# Patient Record
Sex: FEMALE | Race: ASIAN | NOT HISPANIC OR LATINO | URBAN - METROPOLITAN AREA
[De-identification: names, ages, dates, MRNs, and addresses within clinical notes are randomized per-mention and may not be internally consistent; named-entity substitution may affect disease eponyms.]

---

## 2020-05-17 ENCOUNTER — INPATIENT (INPATIENT)
Facility: HOSPITAL | Age: 35
LOS: 1 days | Discharge: HOME | End: 2020-05-19
Attending: OBSTETRICS & GYNECOLOGY | Admitting: OBSTETRICS & GYNECOLOGY

## 2020-05-17 VITALS
RESPIRATION RATE: 20 BRPM | DIASTOLIC BLOOD PRESSURE: 85 MMHG | TEMPERATURE: 99 F | HEART RATE: 92 BPM | SYSTOLIC BLOOD PRESSURE: 145 MMHG

## 2020-05-17 DIAGNOSIS — O26.899 OTHER SPECIFIED PREGNANCY RELATED CONDITIONS, UNSPECIFIED TRIMESTER: ICD-10-CM

## 2020-05-17 DIAGNOSIS — O41.90X0 DISORDER OF AMNIOTIC FLUID AND MEMBRANES, UNSPECIFIED, UNSPECIFIED TRIMESTER, NOT APPLICABLE OR UNSPECIFIED: ICD-10-CM

## 2020-05-17 DIAGNOSIS — Z3A.00 WEEKS OF GESTATION OF PREGNANCY NOT SPECIFIED: ICD-10-CM

## 2020-05-17 LAB
ALBUMIN SERPL ELPH-MCNC: 3.6 G/DL — SIGNIFICANT CHANGE UP (ref 3.3–5)
ALP SERPL-CCNC: 168 U/L — HIGH (ref 40–120)
ALT FLD-CCNC: 7 U/L — SIGNIFICANT CHANGE UP (ref 4–33)
ANION GAP SERPL CALC-SCNC: 14 MMO/L — SIGNIFICANT CHANGE UP (ref 7–14)
APTT BLD: 26.2 SEC — LOW (ref 27.5–36.3)
AST SERPL-CCNC: 18 U/L — SIGNIFICANT CHANGE UP (ref 4–32)
BASOPHILS # BLD AUTO: 0.04 K/UL — SIGNIFICANT CHANGE UP (ref 0–0.2)
BASOPHILS NFR BLD AUTO: 0.3 % — SIGNIFICANT CHANGE UP (ref 0–2)
BILIRUB SERPL-MCNC: 0.2 MG/DL — SIGNIFICANT CHANGE UP (ref 0.2–1.2)
BLD GP AB SCN SERPL QL: NEGATIVE — SIGNIFICANT CHANGE UP
BUN SERPL-MCNC: 11 MG/DL — SIGNIFICANT CHANGE UP (ref 7–23)
CALCIUM SERPL-MCNC: 9.4 MG/DL — SIGNIFICANT CHANGE UP (ref 8.4–10.5)
CHLORIDE SERPL-SCNC: 101 MMOL/L — SIGNIFICANT CHANGE UP (ref 98–107)
CO2 SERPL-SCNC: 19 MMOL/L — LOW (ref 22–31)
CREAT ?TM UR-MCNC: 37.5 MG/DL — SIGNIFICANT CHANGE UP
CREAT SERPL-MCNC: 0.58 MG/DL — SIGNIFICANT CHANGE UP (ref 0.5–1.3)
EOSINOPHIL # BLD AUTO: 0.07 K/UL — SIGNIFICANT CHANGE UP (ref 0–0.5)
EOSINOPHIL NFR BLD AUTO: 0.6 % — SIGNIFICANT CHANGE UP (ref 0–6)
FIBRINOGEN PPP-MCNC: 692 MG/DL — HIGH (ref 300–520)
GLUCOSE SERPL-MCNC: 108 MG/DL — HIGH (ref 70–99)
HCT VFR BLD CALC: 34.8 % — SIGNIFICANT CHANGE UP (ref 34.5–45)
HGB BLD-MCNC: 11.1 G/DL — LOW (ref 11.5–15.5)
HIV COMBO RESULT: SIGNIFICANT CHANGE UP
HIV1+2 AB SPEC QL: SIGNIFICANT CHANGE UP
IMM GRANULOCYTES NFR BLD AUTO: 0.6 % — SIGNIFICANT CHANGE UP (ref 0–1.5)
INR BLD: 0.91 — SIGNIFICANT CHANGE UP (ref 0.88–1.17)
LDH SERPL L TO P-CCNC: 162 U/L — SIGNIFICANT CHANGE UP (ref 135–225)
LYMPHOCYTES # BLD AUTO: 18.7 % — SIGNIFICANT CHANGE UP (ref 13–44)
LYMPHOCYTES # BLD AUTO: 2.37 K/UL — SIGNIFICANT CHANGE UP (ref 1–3.3)
MCHC RBC-ENTMCNC: 27.6 PG — SIGNIFICANT CHANGE UP (ref 27–34)
MCHC RBC-ENTMCNC: 31.9 % — LOW (ref 32–36)
MCV RBC AUTO: 86.6 FL — SIGNIFICANT CHANGE UP (ref 80–100)
MONOCYTES # BLD AUTO: 0.69 K/UL — SIGNIFICANT CHANGE UP (ref 0–0.9)
MONOCYTES NFR BLD AUTO: 5.4 % — SIGNIFICANT CHANGE UP (ref 2–14)
NEUTROPHILS # BLD AUTO: 9.44 K/UL — HIGH (ref 1.8–7.4)
NEUTROPHILS NFR BLD AUTO: 74.4 % — SIGNIFICANT CHANGE UP (ref 43–77)
NRBC # FLD: 0 K/UL — SIGNIFICANT CHANGE UP (ref 0–0)
PLATELET # BLD AUTO: 202 K/UL — SIGNIFICANT CHANGE UP (ref 150–400)
PMV BLD: 11.5 FL — SIGNIFICANT CHANGE UP (ref 7–13)
POTASSIUM SERPL-MCNC: 4.2 MMOL/L — SIGNIFICANT CHANGE UP (ref 3.5–5.3)
POTASSIUM SERPL-SCNC: 4.2 MMOL/L — SIGNIFICANT CHANGE UP (ref 3.5–5.3)
PROT SERPL-MCNC: 7.9 G/DL — SIGNIFICANT CHANGE UP (ref 6–8.3)
PROTHROM AB SERPL-ACNC: 10.3 SEC — SIGNIFICANT CHANGE UP (ref 9.8–13.1)
RBC # BLD: 4.02 M/UL — SIGNIFICANT CHANGE UP (ref 3.8–5.2)
RBC # FLD: 18.9 % — HIGH (ref 10.3–14.5)
RH IG SCN BLD-IMP: POSITIVE — SIGNIFICANT CHANGE UP
SODIUM SERPL-SCNC: 134 MMOL/L — LOW (ref 135–145)
URATE SERPL-MCNC: 4 MG/DL — SIGNIFICANT CHANGE UP (ref 2.5–7)
WBC # BLD: 12.69 K/UL — HIGH (ref 3.8–10.5)
WBC # FLD AUTO: 12.69 K/UL — HIGH (ref 3.8–10.5)

## 2020-05-17 RX ORDER — PRAMOXINE HYDROCHLORIDE 150 MG/15G
1 AEROSOL, FOAM RECTAL EVERY 4 HOURS
Refills: 0 | Status: DISCONTINUED | OUTPATIENT
Start: 2020-05-17 | End: 2020-05-19

## 2020-05-17 RX ORDER — IBUPROFEN 200 MG
600 TABLET ORAL EVERY 6 HOURS
Refills: 0 | Status: COMPLETED | OUTPATIENT
Start: 2020-05-17 | End: 2021-04-15

## 2020-05-17 RX ORDER — BENZOYL PEROXIDE MICRONIZED 5.8 %
1 TOWELETTE (EA) TOPICAL
Qty: 0 | Refills: 0 | DISCHARGE

## 2020-05-17 RX ORDER — DIBUCAINE 1 %
1 OINTMENT (GRAM) RECTAL EVERY 6 HOURS
Refills: 0 | Status: DISCONTINUED | OUTPATIENT
Start: 2020-05-17 | End: 2020-05-19

## 2020-05-17 RX ORDER — LANOLIN
1 OINTMENT (GRAM) TOPICAL EVERY 6 HOURS
Refills: 0 | Status: DISCONTINUED | OUTPATIENT
Start: 2020-05-17 | End: 2020-05-19

## 2020-05-17 RX ORDER — SODIUM CHLORIDE 9 MG/ML
3 INJECTION INTRAMUSCULAR; INTRAVENOUS; SUBCUTANEOUS EVERY 8 HOURS
Refills: 0 | Status: DISCONTINUED | OUTPATIENT
Start: 2020-05-17 | End: 2020-05-19

## 2020-05-17 RX ORDER — SIMETHICONE 80 MG/1
80 TABLET, CHEWABLE ORAL EVERY 4 HOURS
Refills: 0 | Status: DISCONTINUED | OUTPATIENT
Start: 2020-05-17 | End: 2020-05-19

## 2020-05-17 RX ORDER — HYDROCORTISONE 1 %
1 OINTMENT (GRAM) TOPICAL EVERY 6 HOURS
Refills: 0 | Status: DISCONTINUED | OUTPATIENT
Start: 2020-05-17 | End: 2020-05-19

## 2020-05-17 RX ORDER — ACETAMINOPHEN 500 MG
975 TABLET ORAL
Refills: 0 | Status: DISCONTINUED | OUTPATIENT
Start: 2020-05-17 | End: 2020-05-19

## 2020-05-17 RX ORDER — OXYTOCIN 10 UNIT/ML
333.33 VIAL (ML) INJECTION
Qty: 20 | Refills: 0 | Status: DISCONTINUED | OUTPATIENT
Start: 2020-05-17 | End: 2020-05-17

## 2020-05-17 RX ORDER — MAGNESIUM HYDROXIDE 400 MG/1
30 TABLET, CHEWABLE ORAL
Refills: 0 | Status: DISCONTINUED | OUTPATIENT
Start: 2020-05-17 | End: 2020-05-19

## 2020-05-17 RX ORDER — AER TRAVELER 0.5 G/1
1 SOLUTION RECTAL; TOPICAL EVERY 4 HOURS
Refills: 0 | Status: DISCONTINUED | OUTPATIENT
Start: 2020-05-17 | End: 2020-05-19

## 2020-05-17 RX ORDER — SODIUM CHLORIDE 9 MG/ML
1000 INJECTION, SOLUTION INTRAVENOUS
Refills: 0 | Status: DISCONTINUED | OUTPATIENT
Start: 2020-05-17 | End: 2020-05-17

## 2020-05-17 RX ORDER — TETANUS TOXOID, REDUCED DIPHTHERIA TOXOID AND ACELLULAR PERTUSSIS VACCINE, ADSORBED 5; 2.5; 8; 8; 2.5 [IU]/.5ML; [IU]/.5ML; UG/.5ML; UG/.5ML; UG/.5ML
0.5 SUSPENSION INTRAMUSCULAR ONCE
Refills: 0 | Status: DISCONTINUED | OUTPATIENT
Start: 2020-05-17 | End: 2020-05-19

## 2020-05-17 RX ORDER — OXYTOCIN 10 UNIT/ML
333.33 VIAL (ML) INJECTION
Qty: 20 | Refills: 0 | Status: DISCONTINUED | OUTPATIENT
Start: 2020-05-17 | End: 2020-05-19

## 2020-05-17 RX ORDER — DIPHENHYDRAMINE HCL 50 MG
25 CAPSULE ORAL EVERY 6 HOURS
Refills: 0 | Status: DISCONTINUED | OUTPATIENT
Start: 2020-05-17 | End: 2020-05-19

## 2020-05-17 RX ORDER — OXYCODONE HYDROCHLORIDE 5 MG/1
5 TABLET ORAL
Refills: 0 | Status: DISCONTINUED | OUTPATIENT
Start: 2020-05-17 | End: 2020-05-19

## 2020-05-17 RX ORDER — OXYCODONE HYDROCHLORIDE 5 MG/1
5 TABLET ORAL ONCE
Refills: 0 | Status: DISCONTINUED | OUTPATIENT
Start: 2020-05-17 | End: 2020-05-19

## 2020-05-17 RX ORDER — BENZOCAINE 10 %
1 GEL (GRAM) MUCOUS MEMBRANE EVERY 6 HOURS
Refills: 0 | Status: DISCONTINUED | OUTPATIENT
Start: 2020-05-17 | End: 2020-05-19

## 2020-05-17 RX ADMIN — SODIUM CHLORIDE 125 MILLILITER(S): 9 INJECTION, SOLUTION INTRAVENOUS at 19:06

## 2020-05-17 RX ADMIN — Medication 1000 MILLIUNIT(S)/MIN: at 19:06

## 2020-05-17 RX ADMIN — SODIUM CHLORIDE 3 MILLILITER(S): 9 INJECTION INTRAMUSCULAR; INTRAVENOUS; SUBCUTANEOUS at 22:20

## 2020-05-17 RX ADMIN — Medication 0.2 MILLIGRAM(S): at 18:37

## 2020-05-17 NOTE — OB PROVIDER TRIAGE NOTE - NSHPPHYSICALEXAM_GEN_ALL_CORE
Vital Signs Last 24 Hrs  T(C): 37.2 (17 May 2020 15:52), Max: 37.2 (17 May 2020 15:51)  T(F): 98.96 (17 May 2020 15:52), Max: 99 (17 May 2020 15:51)  HR: 96 (17 May 2020 16:16) (92 - 97)  BP: 129/69 (17 May 2020 16:16) (129/69 - 145/85)  BP(mean): --  RR: 20 (17 May 2020 15:51) (20 - 20)  SpO2: --    General: A&O x3  Abdomen: soft, non tender  SSE:  +pooling, clear fluid   +nitrazine  +ferning   SVE: 6-7/80/-2  compound presentation, hand presenting   Dr Walton, Dr Jauregiu at bedside, vertex presentation   TAS: vertex presentation  EFW 5936gm by Leopold's    NST in progress

## 2020-05-17 NOTE — OB RN DELIVERY SUMMARY - NS_SEPSISRSKCALC_OBGYN_ALL_OB_FT
EOS calculated successfully. EOS Risk Factor: 0.5/1000 live births ([facility] national incidence); GA=40w3d; Temp=99; ROM=3.65; GBS='Negative'; Antibiotics='No antibiotics or any antibiotics < 2 hrs prior to birth'

## 2020-05-17 NOTE — DISCHARGE NOTE OB - PATIENT PORTAL LINK FT
You can access the FollowMyHealth Patient Portal offered by [facility] by registering at the following website: [email or web address]. By joining [employer]’s FollowMyHealth portal, you will also be able to view your health information using other applications (apps) compatible with our system.

## 2020-05-17 NOTE — OB PROVIDER H&P - NSHPPHYSICALEXAM_GEN_ALL_CORE
Vital Signs Last 24 Hrs  T(C): 37.2 (17 May 2020 15:52), Max: 37.2 (17 May 2020 15:51)  T(F): 98.96 (17 May 2020 15:52), Max: 99 (17 May 2020 15:51)  HR: 96 (17 May 2020 16:16) (92 - 97)  BP: 129/69 (17 May 2020 16:16) (129/69 - 145/85)  BP(mean): --  RR: 20 (17 May 2020 15:51) (20 - 20)  SpO2: --    General: A&O x3  Abdomen: soft, non tender  SSE:  +pooling, clear fluid   +nitrazine  +ferning   SVE: 6-7/80/-2  compound presentation, hand presenting   Dr Walton, Dr Jauregui at bedside, vertex presentation   TAS: vertex presentation  EFW 6378gm by Leopold's    NST in progress

## 2020-05-17 NOTE — OB PROVIDER H&P - PROBLEM SELECTOR PLAN 1
Labor @ 40.2 weeks for SROM for expectant management of labor  Clear fluid   GBS negative  Covid 19 pending  Preeclampsia labs pending   pt denies pain management at this time

## 2020-05-17 NOTE — OB PROVIDER TRIAGE NOTE - NSOBPROVIDERNOTE_OBGYN_ALL_OB_FT
35 y/o patient 40.2 weeks  presents to triage with c/o of rupture of membranes, clear odorless fluid @ 1445 and irregular contractions. pt reports 8/10 with contraction. pt denies bleeding. pt denies n/v/d, fever or chills. pt endorses +fetal movement.   AP uncomplicated thus far    NKDA  PMH: denies  PSH: denies  OB:   2015 FT 10#  GYN: denies  Social hx: denies  Medications: PNV    General: A&O x3  Abdomen: soft, non tender  SSE:  +pooling, clear fluid   +nitrazine  +ferning   SVE: 6-7/80/-2  compound presentation, hand presenting   Dr Walton, Dr Jauregui at bedside, vertex presentation   TAS: vertex presentation  EFW 3770gm by Leopold's    NST in progress    Addendum @ 1600  Dr Walton, Dr Jauregui, Dr Llanos, notified of compound presentation with hand as presenting part  Dr Walton at bedside, fetus readjusted, vertex presentation    Evidence of SROM  Discussed physical exam and patient's history with Dr Jeronimo  Admit L&D  Labor @ 40.2 weeks for SROM for expectant management of labor  Clear fluid   GBS negative  Covid 19 pending  Preeclampsia labs pending   pt denies pain management at this time

## 2020-05-17 NOTE — DISCHARGE NOTE OB - CARE PLAN
Principal Discharge DX:	Vaginal delivery  Goal:	Routine postpartum care  Assessment and plan of treatment:	follow up in 6 weeks /regular diet & activity as tolerate

## 2020-05-17 NOTE — OB PROVIDER TRIAGE NOTE - HISTORY OF PRESENT ILLNESS
33 y/o patient 40.2 weeks  presents to triage with c/o of rupture of membranes, clear odorless fluid @ 1445 and irregular contractions. pt reports 8/10 with contraction. pt denies bleeding. pt denies n/v/d, fever or chills. pt endorses +fetal movement.   AP uncomplicated thus far    NKDA  PMH: denies  PSH: denies  OB:   2015 FT 10#  GYN: denies  Social hx: denies  Medications: PNV

## 2020-05-17 NOTE — OB PROVIDER H&P - HISTORY OF PRESENT ILLNESS
35 y/o patient 40.2 weeks  presents to triage with c/o of rupture of membranes, clear odorless fluid @ 1445 and irregular contractions. pt reports 8/10 with contraction. pt denies bleeding. pt denies n/v/d, fever or chills. pt endorses +fetal movement.   AP uncomplicated thus far    NKDA  PMH: denies  PSH: denies  OB:   2015 FT 10#  GYN: denies  Social hx: denies  Medications: PNV

## 2020-05-17 NOTE — DISCHARGE NOTE OB - CARE PROVIDER_API CALL
Leobardo Calvin  OBSTETRICS AND GYNECOLOGY  [id number] [address] [place] [address]  Phone: [phone]  Fax: [phone]  Follow Up Time:

## 2020-05-17 NOTE — OB PROVIDER H&P - ASSESSMENT
35 y/o patient 40.2 weeks  presents to triage with c/o of rupture of membranes, clear odorless fluid @ 1445 and irregular contractions. pt reports 8/10 with contraction. pt denies bleeding. pt denies n/v/d, fever or chills. pt endorses +fetal movement.   AP uncomplicated thus far    NKDA  PMH: denies  PSH: denies  OB:   2015 FT 10#  GYN: denies  Social hx: denies  Medications: PNV    General: A&O x3  Abdomen: soft, non tender  SSE:  +pooling, clear fluid   +nitrazine  +ferning   SVE: 6-7/80/-2  compound presentation, hand presenting   Dr Walton, Dr Jauregui at bedside, vertex presentation   TAS: vertex presentation  EFW 3770gm by Leopold's    NST in progress    Addendum @ 1600  Dr Walton, Dr Jauregui, Dr Llanos, notified of compound presentation with hand as presenting part  Dr Walton at bedside, fetus position readjusted, vertex presentation    Evidence of SROM  Discussed physical exam and patient's history with Dr Jeronimo   Admit L&D  Labor @ 40.2 weeks for SROM for expectant management of labor  Clear fluid   GBS negative  Covid 19 pending  Preeclampsia labs pending   pt denies pain management at this time

## 2020-05-17 NOTE — OB PROVIDER TRIAGE NOTE - NS_FINALEDD_OBGYN_ALL_OB_DT
14-May-2020
bilateral upper extremity Active ROM was WFL (within functional limits)/bilateral  lower extremity Active ROM was WFL (within functional limits)

## 2020-05-17 NOTE — OB RN PATIENT PROFILE - ALERT: PERTINENT HISTORY
Ultra Screen at 12 Weeks/Fetal Sonogram/Follow up Sonogram for Growth/Fetal Non-Stress Test (NST)/1st Trimester Sonogram

## 2020-05-18 LAB
SARS-COV-2 RNA SPEC QL NAA+PROBE: SIGNIFICANT CHANGE UP
T PALLIDUM AB TITR SER: NEGATIVE — SIGNIFICANT CHANGE UP

## 2020-05-18 RX ORDER — IBUPROFEN 200 MG
600 TABLET ORAL EVERY 6 HOURS
Refills: 0 | Status: DISCONTINUED | OUTPATIENT
Start: 2020-05-18 | End: 2020-05-19

## 2020-05-18 RX ADMIN — SODIUM CHLORIDE 3 MILLILITER(S): 9 INJECTION INTRAMUSCULAR; INTRAVENOUS; SUBCUTANEOUS at 14:15

## 2020-05-18 RX ADMIN — Medication 1 TABLET(S): at 11:18

## 2020-05-18 RX ADMIN — SODIUM CHLORIDE 3 MILLILITER(S): 9 INJECTION INTRAMUSCULAR; INTRAVENOUS; SUBCUTANEOUS at 06:42

## 2020-05-19 VITALS
DIASTOLIC BLOOD PRESSURE: 61 MMHG | SYSTOLIC BLOOD PRESSURE: 101 MMHG | RESPIRATION RATE: 17 BRPM | OXYGEN SATURATION: 98 % | HEART RATE: 87 BPM | TEMPERATURE: 98 F

## 2020-05-19 RX ADMIN — Medication 600 MILLIGRAM(S): at 00:13

## 2020-05-19 RX ADMIN — SIMETHICONE 80 MILLIGRAM(S): 80 TABLET, CHEWABLE ORAL at 00:17

## 2024-08-31 NOTE — PROGRESS NOTE ADULT - SUBJECTIVE AND OBJECTIVE BOX
S: Patient doing well. Minimal lochia. Pain controlled.  Post Partum day : 1  O: Vital Signs Last 24 Hrs  T(C): 36.8 (18 May 2020 06:00), Max: 37.2 (17 May 2020 15:51)  T(F): 98.3 (18 May 2020 06:00), Max: 99 (17 May 2020 15:51)  HR: 86 (18 May 2020 06:00) (76 - 109)  BP: 104/67 (18 May 2020 06:00) (97/53 - 145/85)  BP(mean): --  RR: 16 (18 May 2020 06:00) (16 - 20)  SpO2: 100% (18 May 2020 06:00) (84% - 100%)    Gen: No acute distress  Abd: soft, NT,  fundus firm below umbilicus  Lochia :Normal  Ext: no tenderness    Labs:                        11.1   12.69 )-----------( 202      ( 17 May 2020 16:11 )             34.8       A: 34y PPD # 1 s/p  doing well.    Plan: Discharge home tomorrow Call bell/Explanation of exam/test